# Patient Record
Sex: FEMALE | Race: WHITE
[De-identification: names, ages, dates, MRNs, and addresses within clinical notes are randomized per-mention and may not be internally consistent; named-entity substitution may affect disease eponyms.]

---

## 2021-05-19 ENCOUNTER — HOSPITAL ENCOUNTER (OUTPATIENT)
Dept: HOSPITAL 33 - SDC-PAIN | Age: 76
Discharge: HOME | End: 2021-05-19
Attending: PSYCHIATRY & NEUROLOGY
Payer: MEDICARE

## 2021-05-19 DIAGNOSIS — Z79.01: ICD-10-CM

## 2021-05-19 DIAGNOSIS — E72.11: ICD-10-CM

## 2021-05-19 DIAGNOSIS — M46.1: Primary | ICD-10-CM

## 2021-05-19 DIAGNOSIS — Z79.899: ICD-10-CM

## 2021-05-19 LAB
INR PPP: 1.99 (ref 0.8–3)
PROTHROMBIN TIME: 22.7 SECONDS (ref 9.95–12.35)

## 2021-05-19 PROCEDURE — 77002 NEEDLE LOCALIZATION BY XRAY: CPT

## 2021-05-19 PROCEDURE — 99100 ANES PT EXTEME AGE<1 YR&>70: CPT

## 2021-05-19 PROCEDURE — G0260 INJ FOR SACROILIAC JT ANESTH: HCPCS

## 2021-05-19 PROCEDURE — 85610 PROTHROMBIN TIME: CPT

## 2021-05-19 PROCEDURE — 72202 X-RAY EXAM SI JOINTS 3/> VWS: CPT

## 2021-05-19 PROCEDURE — 36415 COLL VENOUS BLD VENIPUNCTURE: CPT

## 2021-05-19 PROCEDURE — 27096 INJECT SACROILIAC JOINT: CPT

## 2021-05-19 NOTE — XRAY
Indication: Bilateral SI joint injection.



Intraoperative fluoroscopy provided for 14 seconds.  4 digital spot images

submitted for interpretation demonstrates posterior needle tip projecting over

the inferior left and right SI joint.  Correlate with intraoperative

findings/report.

## 2021-08-25 ENCOUNTER — HOSPITAL ENCOUNTER (OUTPATIENT)
Dept: HOSPITAL 33 - SDC-PAIN | Age: 76
Discharge: HOME | End: 2021-08-25
Attending: PSYCHIATRY & NEUROLOGY
Payer: MEDICARE

## 2021-08-25 DIAGNOSIS — M46.1: Primary | ICD-10-CM

## 2021-08-25 DIAGNOSIS — Z79.01: ICD-10-CM

## 2021-08-25 DIAGNOSIS — Z79.899: ICD-10-CM

## 2021-08-25 LAB
INR PPP: 2.65 (ref 0.8–3)
PROTHROMBIN TIME: 31.3 SECONDS (ref 9.4–12.5)

## 2021-08-25 PROCEDURE — G0260 INJ FOR SACROILIAC JT ANESTH: HCPCS

## 2021-08-25 PROCEDURE — 27096 INJECT SACROILIAC JOINT: CPT

## 2021-08-25 PROCEDURE — 72020 X-RAY EXAM OF SPINE 1 VIEW: CPT

## 2021-08-25 PROCEDURE — 36415 COLL VENOUS BLD VENIPUNCTURE: CPT

## 2021-08-25 PROCEDURE — 85610 PROTHROMBIN TIME: CPT

## 2021-08-25 PROCEDURE — 77002 NEEDLE LOCALIZATION BY XRAY: CPT

## 2021-08-25 NOTE — XRAY
Indication: Right SI joint injection.



Interpreted fluoroscopy provided for 11 seconds.  Single lateral digital spot

image submitted for interpretation demonstrates posterior needle tip

projecting mid sacrum.  Correlate with intraoperative findings/report.

## 2021-10-13 ENCOUNTER — HOSPITAL ENCOUNTER (OUTPATIENT)
Dept: HOSPITAL 33 - SDC-PAIN | Age: 76
Discharge: HOME | End: 2021-10-13
Attending: PSYCHIATRY & NEUROLOGY
Payer: MEDICARE

## 2021-10-13 DIAGNOSIS — Z79.01: ICD-10-CM

## 2021-10-13 DIAGNOSIS — Z79.899: ICD-10-CM

## 2021-10-13 DIAGNOSIS — M47.816: Primary | ICD-10-CM

## 2021-10-13 LAB
INR PPP: 1.19 (ref 0.8–3)
PROTHROMBIN TIME: 14 SECONDS (ref 9.4–12.5)

## 2021-10-13 PROCEDURE — 64494 INJ PARAVERT F JNT L/S 2 LEV: CPT

## 2021-10-13 PROCEDURE — 77002 NEEDLE LOCALIZATION BY XRAY: CPT

## 2021-10-13 PROCEDURE — 64493 INJ PARAVERT F JNT L/S 1 LEV: CPT

## 2021-10-13 PROCEDURE — 72020 X-RAY EXAM OF SPINE 1 VIEW: CPT

## 2021-10-13 PROCEDURE — 36415 COLL VENOUS BLD VENIPUNCTURE: CPT

## 2021-10-13 PROCEDURE — 85610 PROTHROMBIN TIME: CPT

## 2021-10-13 NOTE — XRAY
Indication: Bilateral L4-S1 MBB.



Intraoperative fluoroscopy provided for 10 seconds.  Single digital spot image

submitted for interpretation demonstrates posterior needle tips projecting

over the expected left and right L4-L5 and S1 nerve roots.  Correlate with

intraoperative findings/report.

## 2023-01-11 ENCOUNTER — HOSPITAL ENCOUNTER (OUTPATIENT)
Dept: HOSPITAL 33 - SDC-PAIN | Age: 78
Discharge: HOME | End: 2023-01-11
Attending: PSYCHIATRY & NEUROLOGY
Payer: MEDICARE

## 2023-01-11 DIAGNOSIS — Z79.899: ICD-10-CM

## 2023-01-11 DIAGNOSIS — M47.816: Primary | ICD-10-CM

## 2023-01-11 PROCEDURE — 64494 INJ PARAVERT F JNT L/S 2 LEV: CPT

## 2023-01-11 PROCEDURE — 64493 INJ PARAVERT F JNT L/S 1 LEV: CPT

## 2023-01-11 PROCEDURE — 72020 X-RAY EXAM OF SPINE 1 VIEW: CPT

## 2023-01-11 PROCEDURE — 77002 NEEDLE LOCALIZATION BY XRAY: CPT

## 2023-05-03 ENCOUNTER — HOSPITAL ENCOUNTER (OUTPATIENT)
Dept: HOSPITAL 33 - SDC-PAIN | Age: 78
Discharge: HOME | End: 2023-05-03
Attending: PSYCHIATRY & NEUROLOGY
Payer: MEDICARE

## 2023-05-03 DIAGNOSIS — M47.816: Primary | ICD-10-CM

## 2023-05-03 DIAGNOSIS — Z79.899: ICD-10-CM

## 2023-05-03 DIAGNOSIS — Z53.8: Primary | ICD-10-CM

## 2023-05-03 PROCEDURE — 64635 DESTROY LUMB/SAC FACET JNT: CPT

## 2023-05-03 PROCEDURE — 64636 DESTROY L/S FACET JNT ADDL: CPT

## 2023-05-03 PROCEDURE — 99100 ANES PT EXTEME AGE<1 YR&>70: CPT

## 2023-05-03 PROCEDURE — 72100 X-RAY EXAM L-S SPINE 2/3 VWS: CPT

## 2023-05-03 PROCEDURE — 77002 NEEDLE LOCALIZATION BY XRAY: CPT

## 2023-05-03 NOTE — XRAY
Indication: Right L4-S1 RFA.



Intraoperative fluoroscopy provided for 19 seconds.  5 digital spot image

submitted for interpretation demonstrates posterior needle tips projecting

over the expected right L4-S1 nerve roots.  Correlate with intraoperative

findings/report.

## 2023-05-10 ENCOUNTER — HOSPITAL ENCOUNTER (OUTPATIENT)
Dept: HOSPITAL 33 - SDC-PAIN | Age: 78
Discharge: HOME | End: 2023-05-10
Attending: PSYCHIATRY & NEUROLOGY
Payer: MEDICARE

## 2023-05-10 DIAGNOSIS — Z79.899: ICD-10-CM

## 2023-05-10 DIAGNOSIS — M47.816: Primary | ICD-10-CM

## 2023-05-10 PROCEDURE — 72100 X-RAY EXAM L-S SPINE 2/3 VWS: CPT

## 2023-05-10 PROCEDURE — 64636 DESTROY L/S FACET JNT ADDL: CPT

## 2023-05-10 PROCEDURE — 99100 ANES PT EXTEME AGE<1 YR&>70: CPT

## 2023-05-10 PROCEDURE — 77002 NEEDLE LOCALIZATION BY XRAY: CPT

## 2023-05-10 PROCEDURE — 64635 DESTROY LUMB/SAC FACET JNT: CPT

## 2023-05-10 NOTE — XRAY
Indication: Left L4-S1 RFA.



Intraoperative fluoroscopy provided for 21 seconds.  3 digital spot image

submitted for interpretation demonstrates posterior needle tips projecting

over the expected left L4-S1 nerve roots.  Correlate with intraoperative

findings/report.

## 2023-06-21 ENCOUNTER — HOSPITAL ENCOUNTER (OUTPATIENT)
Dept: HOSPITAL 33 - SDC-PAIN | Age: 78
Discharge: HOME | End: 2023-06-21
Attending: PSYCHIATRY & NEUROLOGY
Payer: MEDICARE

## 2023-06-21 DIAGNOSIS — M46.1: Primary | ICD-10-CM

## 2023-06-21 DIAGNOSIS — Z79.899: ICD-10-CM

## 2023-06-21 PROCEDURE — G0260 INJ FOR SACROILIAC JT ANESTH: HCPCS

## 2023-06-21 PROCEDURE — 99100 ANES PT EXTEME AGE<1 YR&>70: CPT

## 2023-06-21 PROCEDURE — 27096 INJECT SACROILIAC JOINT: CPT

## 2023-06-21 PROCEDURE — 01992 ANES DX/THER NRV BLK&INJ PRN: CPT

## 2023-06-21 PROCEDURE — 72170 X-RAY EXAM OF PELVIS: CPT

## 2023-06-21 PROCEDURE — 77002 NEEDLE LOCALIZATION BY XRAY: CPT

## 2023-06-21 NOTE — XRAY
Indication: Left SI joint injection.



Intraoperative fluoroscopy provided for 13 seconds.  2 digital spot images

submitted for interpretation demonstrates posterior needle tip projecting over

the left SI joint.  Correlate with intraoperative findings/report.